# Patient Record
Sex: MALE | Race: WHITE | NOT HISPANIC OR LATINO
[De-identification: names, ages, dates, MRNs, and addresses within clinical notes are randomized per-mention and may not be internally consistent; named-entity substitution may affect disease eponyms.]

---
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
## 2020-02-16 ENCOUNTER — OFFICE VISIT (OUTPATIENT)
Dept: URGENT CARE | Facility: CLINIC | Age: 20
End: 2020-02-16
Payer: COMMERCIAL

## 2020-02-16 ENCOUNTER — APPOINTMENT (OUTPATIENT)
Dept: RADIOLOGY | Facility: IMAGING CENTER | Age: 20
End: 2020-02-16
Attending: NURSE PRACTITIONER
Payer: COMMERCIAL

## 2020-02-16 VITALS
BODY MASS INDEX: 21.12 KG/M2 | TEMPERATURE: 102.5 F | HEART RATE: 113 BPM | WEIGHT: 142.6 LBS | HEIGHT: 69 IN | SYSTOLIC BLOOD PRESSURE: 114 MMHG | RESPIRATION RATE: 16 BRPM | OXYGEN SATURATION: 94 % | DIASTOLIC BLOOD PRESSURE: 82 MMHG

## 2020-02-16 DIAGNOSIS — J02.9 PHARYNGITIS, UNSPECIFIED ETIOLOGY: ICD-10-CM

## 2020-02-16 DIAGNOSIS — R50.9 FEVER, UNSPECIFIED FEVER CAUSE: ICD-10-CM

## 2020-02-16 DIAGNOSIS — R11.0 NAUSEA: ICD-10-CM

## 2020-02-16 DIAGNOSIS — J03.90 EXUDATIVE TONSILLITIS: ICD-10-CM

## 2020-02-16 LAB
FLUAV+FLUBV AG SPEC QL IA: NEGATIVE
INT CON NEG: NEGATIVE
INT CON NEG: NEGATIVE
INT CON POS: POSITIVE
INT CON POS: POSITIVE
S PYO AG THROAT QL: NEGATIVE

## 2020-02-16 PROCEDURE — 87880 STREP A ASSAY W/OPTIC: CPT | Performed by: NURSE PRACTITIONER

## 2020-02-16 PROCEDURE — 99204 OFFICE O/P NEW MOD 45 MIN: CPT | Performed by: NURSE PRACTITIONER

## 2020-02-16 PROCEDURE — 87804 INFLUENZA ASSAY W/OPTIC: CPT | Performed by: NURSE PRACTITIONER

## 2020-02-16 PROCEDURE — 71046 X-RAY EXAM CHEST 2 VIEWS: CPT | Mod: TC | Performed by: NURSE PRACTITIONER

## 2020-02-16 RX ORDER — ONDANSETRON 4 MG/1
4 TABLET, ORALLY DISINTEGRATING ORAL ONCE
Status: COMPLETED | OUTPATIENT
Start: 2020-02-16 | End: 2020-02-16

## 2020-02-16 RX ORDER — DIPHENHYDRAMINE HYDROCHLORIDE AND LIDOCAINE HYDROCHLORIDE AND ALUMINUM HYDROXIDE AND MAGNESIUM HYDRO
5 KIT EVERY 6 HOURS PRN
Qty: 120 ML | Refills: 0 | Status: SHIPPED | OUTPATIENT
Start: 2020-02-16

## 2020-02-16 RX ORDER — OSELTAMIVIR PHOSPHATE 75 MG/1
75 CAPSULE ORAL 2 TIMES DAILY
Qty: 10 CAP | Refills: 0 | Status: CANCELLED | OUTPATIENT
Start: 2020-02-16

## 2020-02-16 RX ORDER — ONDANSETRON 4 MG/1
4 TABLET, ORALLY DISINTEGRATING ORAL EVERY 8 HOURS PRN
Qty: 10 TAB | Refills: 0 | Status: SHIPPED | OUTPATIENT
Start: 2020-02-16

## 2020-02-16 RX ORDER — AMOXICILLIN 500 MG/1
500 CAPSULE ORAL 2 TIMES DAILY
Qty: 20 CAP | Refills: 0 | Status: SHIPPED | OUTPATIENT
Start: 2020-02-16 | End: 2020-02-26

## 2020-02-16 RX ORDER — AMOXICILLIN 500 MG/1
500 CAPSULE ORAL 2 TIMES DAILY
Qty: 20 CAP | Refills: 0 | Status: CANCELLED | OUTPATIENT
Start: 2020-02-16 | End: 2020-02-26

## 2020-02-16 RX ORDER — ACETAMINOPHEN 500 MG
500 TABLET ORAL ONCE
Status: COMPLETED | OUTPATIENT
Start: 2020-02-16 | End: 2020-02-16

## 2020-02-16 RX ADMIN — ONDANSETRON 4 MG: 4 TABLET, ORALLY DISINTEGRATING ORAL at 16:26

## 2020-02-16 RX ADMIN — Medication 500 MG: at 16:25

## 2020-02-16 ASSESSMENT — ENCOUNTER SYMPTOMS
MYALGIAS: 0
CHILLS: 1
VOMITING: 1
FEVER: 1
DIZZINESS: 0
SHORTNESS OF BREATH: 0
COUGH: 1
SORE THROAT: 1
EYE PAIN: 0
HEADACHES: 1
RHINORRHEA: 1
NAUSEA: 1

## 2020-02-17 NOTE — PROGRESS NOTES
Subjective:     Barak Cheney  is a 19 y.o. male who presents for URI (body aches, felt feversih, productive cough, sore-throat x 3 days.    Was sick last week---got better but then got worse Friday again )       URI    This is a new problem. Episode onset: 2 weeks off and on cough. 1 day ago worsening symptoms with fever, severe body aches, sore throat. The problem has been unchanged. The maximum temperature recorded prior to his arrival was 102 - 102.9 F. The fever has been present for less than 1 day. Associated symptoms include congestion, coughing, headaches, nausea, rhinorrhea, a sore throat and vomiting. Pertinent negatives include no chest pain, dysuria, ear pain, plugged ear sensation or rash. He has tried sleep for the symptoms. The treatment provided no relief.   History reviewed. No pertinent past medical history.History reviewed. No pertinent surgical history.  Social History     Socioeconomic History   • Marital status: Single     Spouse name: Not on file   • Number of children: Not on file   • Years of education: Not on file   • Highest education level: Not on file   Occupational History   • Not on file   Social Needs   • Financial resource strain: Not on file   • Food insecurity     Worry: Not on file     Inability: Not on file   • Transportation needs     Medical: Not on file     Non-medical: Not on file   Tobacco Use   • Smoking status: Never Smoker   • Smokeless tobacco: Never Used   Substance and Sexual Activity   • Alcohol use: Yes     Comment: socially   • Drug use: Yes     Types: Marijuana   • Sexual activity: Not on file   Lifestyle   • Physical activity     Days per week: Not on file     Minutes per session: Not on file   • Stress: Not on file   Relationships   • Social connections     Talks on phone: Not on file     Gets together: Not on file     Attends Sabianism service: Not on file     Active member of club or organization: Not on file     Attends meetings of clubs or organizations: Not  "on file     Relationship status: Not on file   • Intimate partner violence     Fear of current or ex partner: Not on file     Emotionally abused: Not on file     Physically abused: Not on file     Forced sexual activity: Not on file   Other Topics Concern   • Not on file   Social History Narrative   • Not on file    History reviewed. No pertinent family history. Review of Systems   Constitutional: Positive for chills, fever and malaise/fatigue.   HENT: Positive for congestion, rhinorrhea and sore throat. Negative for ear pain.    Eyes: Negative for pain.   Respiratory: Positive for cough. Negative for shortness of breath.    Cardiovascular: Negative for chest pain.   Gastrointestinal: Positive for nausea and vomiting.   Genitourinary: Negative for dysuria and hematuria.   Musculoskeletal: Negative for myalgias.   Skin: Negative for rash.   Neurological: Positive for headaches. Negative for dizziness.   Not on File   Objective:   /82 (BP Location: Left arm, Patient Position: Sitting, BP Cuff Size: Adult)   Pulse (!) 113   Temp (!) 39.2 °C (102.5 °F) (Temporal)   Resp 16   Ht 1.753 m (5' 9\")   Wt 64.7 kg (142 lb 9.6 oz)   SpO2 94%   BMI 21.06 kg/m²   Physical Exam  Vitals signs and nursing note reviewed.   Constitutional:       General: He is not in acute distress.     Appearance: He is well-developed. He is ill-appearing.   HENT:      Head: Normocephalic and atraumatic.      Right Ear: Tympanic membrane and external ear normal.      Left Ear: Tympanic membrane and external ear normal.      Nose: Nose normal.      Right Sinus: No maxillary sinus tenderness or frontal sinus tenderness.      Left Sinus: No maxillary sinus tenderness or frontal sinus tenderness.      Mouth/Throat:      Mouth: Mucous membranes are moist.      Pharynx: Uvula midline. Posterior oropharyngeal erythema present.      Tonsils: Tonsillar exudate present. No tonsillar abscesses. 2+ on the right. 2+ on the left.   Eyes:      General:  "        Right eye: No discharge.         Left eye: No discharge.      Conjunctiva/sclera: Conjunctivae normal.      Pupils: Pupils are equal, round, and reactive to light.   Cardiovascular:      Rate and Rhythm: Normal rate and regular rhythm.      Heart sounds: No murmur.   Pulmonary:      Effort: Pulmonary effort is normal. No respiratory distress.      Breath sounds: Normal breath sounds.   Abdominal:      General: There is no distension.      Palpations: Abdomen is soft.      Tenderness: There is no abdominal tenderness.   Musculoskeletal: Normal range of motion.   Skin:     General: Skin is warm and dry.   Neurological:      General: No focal deficit present.      Mental Status: He is alert and oriented to person, place, and time. Mental status is at baseline.      Gait: Gait (gait at baseline) normal.   Psychiatric:         Judgment: Judgment normal.           Assessment/Plan:   Assessment    1. Pharyngitis, unspecified etiology  POCT Rapid Strep A    POCT Influenza A/B    amoxicillin (AMOXIL) 500 MG Cap    DPH-Lido-AlHydr-MgHydr-Simeth (MAGIC MOUTHWASH BLM) Suspension   2. Fever, unspecified fever cause  POCT Rapid Strep A    POCT Influenza A/B    acetaminophen (TYLENOL) tablet 500 mg    DX-CHEST-2 VIEWS   3. Nausea  ondansetron (ZOFRAN ODT) dispertab 4 mg    ondansetron (ZOFRAN ODT) 4 MG TABLET DISPERSIBLE   4. Exudative tonsillitis  amoxicillin (AMOXIL) 500 MG Cap    DPH-Lido-AlHydr-MgHydr-Simeth (MAGIC MOUTHWASH BLM) Suspension     Strep negative  Influenza negative     Xray results  No acute cardiopulmonary abnormality.     PT on clinical presentation has fever, adenopathy, exudate on oropharynx and it's possible beyond the strep A testing that this could be a different type of strep (C/G) or A. haemolyticum start patient on amoxicillin.  For suspected bacterial etiology.  Advised to continue supportive care with Tylenol and/or ibuprofen for fevers and discomfort. Increased fluids and  electrolytes.  Differential diagnosis, natural history, supportive care, and indications for immediate follow-up discussed.

## 2020-02-20 ENCOUNTER — HOSPITAL ENCOUNTER (EMERGENCY)
Facility: MEDICAL CENTER | Age: 20
End: 2020-02-21
Attending: EMERGENCY MEDICINE
Payer: COMMERCIAL

## 2020-02-20 DIAGNOSIS — J02.9 PHARYNGITIS, UNSPECIFIED ETIOLOGY: ICD-10-CM

## 2020-02-20 PROCEDURE — 36415 COLL VENOUS BLD VENIPUNCTURE: CPT

## 2020-02-20 PROCEDURE — 99285 EMERGENCY DEPT VISIT HI MDM: CPT

## 2020-02-20 PROCEDURE — 96374 THER/PROPH/DIAG INJ IV PUSH: CPT

## 2020-02-20 PROCEDURE — 700105 HCHG RX REV CODE 258: Performed by: EMERGENCY MEDICINE

## 2020-02-20 PROCEDURE — 700111 HCHG RX REV CODE 636 W/ 250 OVERRIDE (IP): Performed by: EMERGENCY MEDICINE

## 2020-02-20 RX ORDER — SODIUM CHLORIDE 9 MG/ML
1000 INJECTION, SOLUTION INTRAVENOUS ONCE
Status: COMPLETED | OUTPATIENT
Start: 2020-02-21 | End: 2020-02-21

## 2020-02-20 RX ORDER — KETOROLAC TROMETHAMINE 30 MG/ML
15 INJECTION, SOLUTION INTRAMUSCULAR; INTRAVENOUS ONCE
Status: COMPLETED | OUTPATIENT
Start: 2020-02-21 | End: 2020-02-20

## 2020-02-20 RX ADMIN — SODIUM CHLORIDE 1000 ML: 9 INJECTION, SOLUTION INTRAVENOUS at 23:58

## 2020-02-20 RX ADMIN — KETOROLAC TROMETHAMINE 15 MG: 30 INJECTION, SOLUTION INTRAMUSCULAR at 23:57

## 2020-02-21 VITALS
HEART RATE: 88 BPM | TEMPERATURE: 99.7 F | DIASTOLIC BLOOD PRESSURE: 59 MMHG | WEIGHT: 142.42 LBS | BODY MASS INDEX: 20.39 KG/M2 | HEIGHT: 70 IN | RESPIRATION RATE: 19 BRPM | SYSTOLIC BLOOD PRESSURE: 110 MMHG | OXYGEN SATURATION: 100 %

## 2020-02-21 LAB
ALBUMIN SERPL BCP-MCNC: 4.1 G/DL (ref 3.2–4.9)
ALBUMIN/GLOB SERPL: 1.1 G/DL
ALP SERPL-CCNC: 70 U/L (ref 30–99)
ALT SERPL-CCNC: 29 U/L (ref 2–50)
ANION GAP SERPL CALC-SCNC: 10 MMOL/L (ref 0–11.9)
AST SERPL-CCNC: 22 U/L (ref 12–45)
BASOPHILS # BLD AUTO: 0.4 % (ref 0–1.8)
BASOPHILS # BLD: 0.06 K/UL (ref 0–0.12)
BILIRUB SERPL-MCNC: 1.1 MG/DL (ref 0.1–1.5)
BUN SERPL-MCNC: 15 MG/DL (ref 8–22)
C TRACH DNA SPEC QL NAA+PROBE: NEGATIVE
CALCIUM SERPL-MCNC: 9.2 MG/DL (ref 8.5–10.5)
CHLORIDE SERPL-SCNC: 102 MMOL/L (ref 96–112)
CO2 SERPL-SCNC: 25 MMOL/L (ref 20–33)
CREAT SERPL-MCNC: 1.25 MG/DL (ref 0.5–1.4)
EOSINOPHIL # BLD AUTO: 0.12 K/UL (ref 0–0.51)
EOSINOPHIL NFR BLD: 0.9 % (ref 0–6.9)
ERYTHROCYTE [DISTWIDTH] IN BLOOD BY AUTOMATED COUNT: 39.7 FL (ref 35.9–50)
GLOBULIN SER CALC-MCNC: 3.9 G/DL (ref 1.9–3.5)
GLUCOSE SERPL-MCNC: 97 MG/DL (ref 65–99)
HCT VFR BLD AUTO: 43.6 % (ref 42–52)
HETEROPH AB SER QL: NEGATIVE
HGB BLD-MCNC: 14.9 G/DL (ref 14–18)
IMM GRANULOCYTES # BLD AUTO: 0.07 K/UL (ref 0–0.11)
IMM GRANULOCYTES NFR BLD AUTO: 0.5 % (ref 0–0.9)
LACTATE BLD-SCNC: 1.3 MMOL/L (ref 0.5–2)
LYMPHOCYTES # BLD AUTO: 1.69 K/UL (ref 1–4.8)
LYMPHOCYTES NFR BLD: 12.5 % (ref 22–41)
MCH RBC QN AUTO: 30.5 PG (ref 27–33)
MCHC RBC AUTO-ENTMCNC: 34.2 G/DL (ref 33.7–35.3)
MCV RBC AUTO: 89.3 FL (ref 81.4–97.8)
MONOCYTES # BLD AUTO: 1.06 K/UL (ref 0–0.85)
MONOCYTES NFR BLD AUTO: 7.8 % (ref 0–13.4)
N GONORRHOEA DNA SPEC QL NAA+PROBE: NEGATIVE
NEUTROPHILS # BLD AUTO: 10.54 K/UL (ref 1.82–7.42)
NEUTROPHILS NFR BLD: 77.9 % (ref 44–72)
NRBC # BLD AUTO: 0 K/UL
NRBC BLD-RTO: 0 /100 WBC
PLATELET # BLD AUTO: 239 K/UL (ref 164–446)
PMV BLD AUTO: 8.4 FL (ref 9–12.9)
POTASSIUM SERPL-SCNC: 3.8 MMOL/L (ref 3.6–5.5)
PROT SERPL-MCNC: 8 G/DL (ref 6–8.2)
RBC # BLD AUTO: 4.88 M/UL (ref 4.7–6.1)
S PYO DNA SPEC NAA+PROBE: NOT DETECTED
SODIUM SERPL-SCNC: 137 MMOL/L (ref 135–145)
SPECIMEN SOURCE: NORMAL
WBC # BLD AUTO: 13.5 K/UL (ref 4.8–10.8)

## 2020-02-21 PROCEDURE — 87591 N.GONORRHOEAE DNA AMP PROB: CPT

## 2020-02-21 PROCEDURE — 80053 COMPREHEN METABOLIC PANEL: CPT

## 2020-02-21 PROCEDURE — 700111 HCHG RX REV CODE 636 W/ 250 OVERRIDE (IP): Performed by: EMERGENCY MEDICINE

## 2020-02-21 PROCEDURE — 87491 CHLMYD TRACH DNA AMP PROBE: CPT

## 2020-02-21 PROCEDURE — 85025 COMPLETE CBC W/AUTO DIFF WBC: CPT

## 2020-02-21 PROCEDURE — 86308 HETEROPHILE ANTIBODY SCREEN: CPT

## 2020-02-21 PROCEDURE — 87651 STREP A DNA AMP PROBE: CPT

## 2020-02-21 PROCEDURE — 87040 BLOOD CULTURE FOR BACTERIA: CPT

## 2020-02-21 PROCEDURE — 83605 ASSAY OF LACTIC ACID: CPT

## 2020-02-21 RX ORDER — CLINDAMYCIN HYDROCHLORIDE 300 MG/1
300 CAPSULE ORAL 3 TIMES DAILY
Qty: 30 CAP | Refills: 0 | Status: SHIPPED | OUTPATIENT
Start: 2020-02-21 | End: 2020-03-02

## 2020-02-21 RX ORDER — ONDANSETRON 4 MG/1
4 TABLET, ORALLY DISINTEGRATING ORAL EVERY 6 HOURS PRN
Qty: 10 TAB | Refills: 0 | Status: SHIPPED | OUTPATIENT
Start: 2020-02-21

## 2020-02-21 RX ORDER — DEXAMETHASONE SODIUM PHOSPHATE 10 MG/ML
12 INJECTION, SOLUTION INTRAMUSCULAR; INTRAVENOUS ONCE
Status: COMPLETED | OUTPATIENT
Start: 2020-02-21 | End: 2020-02-21

## 2020-02-21 RX ADMIN — DEXAMETHASONE SODIUM PHOSPHATE 12 MG: 10 INJECTION INTRAMUSCULAR; INTRAVENOUS at 02:00

## 2020-02-21 NOTE — DISCHARGE INSTRUCTIONS
You were seen in the Emergency Department for pharyngitis.    Labs were completed without significant acute abnormalities other than mild elevation of white blood cell count.  Drug testing was negative.  Further cultures are pending.  Mono testing was negative although this may need to be repeated in a week or 2 for continued symptoms.    Please use 1,000mg of tylenol or 600mg of ibuprofen every 6 hours as needed for pain.  Stop taking amoxicillin and start new antibiotic.    Please follow up with your primary care physician for recheck on Monday.    Return to the Emergency Department with persistent fevers greater than 100.4, trouble breathing, persistent vomiting, or other concerns.

## 2020-02-21 NOTE — ED NOTES
Patient discharged with instructions for pharyngitis  with prescriptions x2 signs and symptoms explained to patient for reasons to return to emergency department, Patient is understanding and has no further questions.

## 2020-02-21 NOTE — ED PROVIDER NOTES
ED Provider Note    Scribed for Sylvie Drummond M.D. by Dafne Christensen. 2/20/2020  11:35 PM    Means of arrival: Walk-in  History obtained from: Patient  History limited by: None    CHIEF COMPLAINT  Chief Complaint   Patient presents with   • Fever   • Sore Throat     HPI  Barak Cheney is a 19 y.o. otherwise healthy male who presents to the Emergency Department for fever and sore throat that began about 5 days ago. The patient was seen at urgent care with a negative influenza and strep test.  He was prescribed amoxicillin for concern for bacterial pharyngitis. The patient states his symptoms have not been improving.  He states he has had consistent fevers, sore throat, myalgias.. The patient has used anti-inflammatories with mild alleviation. He denies cough or congestion. The patient states he has mucous draining into his stomach. He reports abdominal pain which he attributes to the mucous drainage. Additionally, he reports a mild headache. The patient denies recent travel. He denies any medical history of medication use.  Patient denies any concern for sexually transmitted infection or HIV.    REVIEW OF SYSTEMS  Pertinent positive include fever, vomiting, muscle fatigue, sore throat, mucous drainage, abdominal pain, and headache. Pertinent negative include no cough or congestion. All other systems reviewed and are negative.    PAST MEDICAL HISTORY   Patient has no pertinent past medical history.    SOCIAL HISTORY  Social History     Tobacco Use   • Smoking status: Never Smoker   • Smokeless tobacco: Never Used   Substance and Sexual Activity   • Alcohol use: Yes     Comment: socially   • Drug use: Yes     Types: Marijuana   • Sexual activity: Not pertinent     SURGICAL HISTORY  patient denies any surgical history    CURRENT MEDICATIONS  Current Outpatient Medications:   •  amoxicillin (AMOXIL) 500 MG Cap, Take 1 Cap by mouth 2 times a day for 10 days., Disp: 20 Cap, Rfl: 0  •  DPH-Lido-AlHydr-MgHydr-Simeth  "(MAGIC MOUTHWASH BLM) Suspension, Take 5 mL by mouth every 6 hours as needed., Disp: 120 mL, Rfl: 0  •  ondansetron (ZOFRAN ODT) 4 MG TABLET DISPERSIBLE, Take 1 Tab by mouth every 8 hours as needed., Disp: 10 Tab, Rfl: 0    ALLERGIES  No Known Allergies    PHYSICAL EXAM   VITAL SIGNS: /84   Pulse (!) 107   Temp 37.7 °C (99.9 °F) (Oral)   Resp 19   Ht 1.778 m (5' 10\")   Wt 64.6 kg (142 lb 6.7 oz)   SpO2 95%   BMI 20.43 kg/m²    Constitutional: Young male, Non-toxic appearing, Alert in no apparent distress.  HENT: Normocephalic, Atraumatic. Bilateral external ears normal. Nose normal.  Moist mucous membranes.  Posterior oropharynx with erythema and exudates. No significant tonsillar swelling.  Eyes: Pupils are equal and reactive. Conjunctiva normal.   Neck: Supple, full range of motion, Cervical lymphadenopathy, No meningismus.  Heart: Regular rate and rhythm.  No murmurs.    Lungs: No respiratory distress, normal work of breathing. Lungs clear to auscultation bilaterally.  Abdomen Soft, no distention.  No tenderness to palpation.  Musculoskeletal: Atraumatic. No obvious deformities noted.  No lower extremity edema.  Skin: Warm, Dry.  No erythema, No rash.   Neurologic: Alert and oriented x3. Moving all extremities spontaneously without focal deficits.  Psychiatric: Affect normal, Mood normal, Appears appropriate and not intoxicated.    DIAGNOSTIC STUDIES    LABS  Personally reviewed by me  Labs Reviewed   CBC WITH DIFFERENTIAL - Abnormal; Notable for the following components:       Result Value    WBC 13.5 (*)     MPV 8.4 (*)     Neutrophils-Polys 77.90 (*)     Lymphocytes 12.50 (*)     Neutrophils (Absolute) 10.54 (*)     Monos (Absolute) 1.06 (*)     All other components within normal limits   COMP METABOLIC PANEL - Abnormal; Notable for the following components:    Globulin 3.9 (*)     All other components within normal limits   LACTIC ACID   BLOOD CULTURE    Narrative:     Per Hospital Policy: Only " "change Specimen Src: to \"Line\" if  specified by physician order.   BLOOD CULTURE    Narrative:     Per Hospital Policy: Only change Specimen Src: to \"Line\" if  specified by physician order.   GROUP A STREP BY PCR   HETEROPHILE AB SCREEN   ESTIMATED GFR   CHLAMYDIA/GC PCR URINE OR SWAB    Narrative:     NOT CERVICAL - FROM THROAT  NOT GENITAL FROM THROAT     ED COURSE  Vitals:    02/20/20 2253 02/20/20 2302 02/21/20 0150   BP: 126/84  110/59   Pulse: (!) 107  88   Resp: 19     Temp: 37.7 °C (99.9 °F)  37.6 °C (99.7 °F)   TempSrc: Oral  Temporal   SpO2: 95%  100%   Weight:  64.6 kg (142 lb 6.7 oz)    Height:  1.778 m (5' 10\")      Medications administered:  Medications   ketorolac (TORADOL) injection 15 mg (15 mg Intravenous Given 2/20/20 0967)   NS infusion 1,000 mL (0 mL Intravenous Stopped 2/21/20 0142)   dexamethasone pf (DECADRON) injection 12 mg (12 mg Oral Given 2/21/20 0200)     Patient was given IV fluids for tachycardia and poor p.o. intake.  IV hydration was used because oral hydration was not adequate alone.  Following fluid administration patient's symptoms and hydration status were improved.     11:35 PM Patient seen and examined at bedside. The patient presents with fever and sore throat. Ordered for Estimated GFR, Group A Strep by PCR, Mononucleosis Test Qual, CBC with Differential, CMP, Lactic Acid, and Blood Culture to evaluate. Patient will be treated with Toradol 15 mg and NS Infusion 1000 mL for his symptoms.     MEDICAL DECISION MAKING  Otherwise healthy young male presents with 5-day history of fever and sore throat.  He has been on amoxicillin for presumed bacterial infection however is not improving.  He is afebrile, mildly tachycardic on arrival with otherwise reassuring vitals and nontoxic-appearing.  History and exam are not concerning for meningitis, peritonsillar abscess, retropharyngeal abscess.  He does have evidence of significant tonsillar erythema and exudates however.  Labs show " evidence of mild leukocytosis however otherwise reassuring without signs of sepsis.  Strep and mono testing are negative, however it is possible the Monospot is not positive this early in his symptoms.  I had a discussion with the patient regarding the possibility of sexually transmitted infection such as HIV, gonorrhea, or chlamydia.  He is denies any concern for these infections as he is in a monogamous relationship.  Swabs are currently pending however I have not treated him empirically.  It is possible that his symptoms are due to of viral process however I will transition him from amoxicillin to clindamycin for broader bacterial coverage.    1:17 AM - Upon reassessment, patient is resting comfortably with normal vital signs.  No new complaints at this time.  discussed results with patient and/or family as well as importance of primary care follow up next week for recheck.  Patient understands plan of care and strict return precautions for new or changing symptoms.        The patient will return for new or worsening symptoms and is stable at the time of discharge.    The patient is referred to a primary physician for blood pressure management, diabetic screening, and for all other preventative health concerns.    DISPOSITION:  Patient will be discharged home in stable condition.    FOLLOW UP:  Alex ROCHA M.D.  [address] [facility] [id number] [phone]    Schedule an appointment as soon as possible for a visit       [facility], Emergency Dept  [address] [facility] [address] [phone]    If symptoms worsen    IMPRESSION  (J02.9) Pharyngitis, unspecified etiology    Results, diagnoses, and treatment options were discussed with the patient and/or family. Patient verbalized understanding of plan of care.    Discharge Medication List as of 2/21/2020  1:53 AM      START taking these medications    Details   clindamycin (CLEOCIN) 300 MG Cap Take 1 Cap by mouth 3  times a day for 10 days., Disp-30 Cap, R-0, Print Rx Paper      !! ondansetron (ZOFRAN ODT) 4 MG TABLET DISPERSIBLE Take 1 Tab by mouth every 6 hours as needed for Nausea., Disp-10 Tab, R-0, Print Rx Paper       !! - Potential duplicate medications found. Please discuss with provider.               Dafne GOODRICH (Scribe), am scribing for, and in the presence of, Sylvie Drummond M.D..    Electronically signed by: Dafne Christensen (Scribe), 2/20/2020    Sylvie GOODRICH M.D. personally performed the services described in this documentation, as scribed by Dafne Christensen in my presence, and it is both accurate and complete. C    The note accurately reflects work and decisions made by me.  Sylvie Drummond M.D.  2/21/2020  3:51 AM

## 2020-02-21 NOTE — ED TRIAGE NOTES
"Chief Complaint   Patient presents with   • Fever   • Sore Throat     18 yo male to triage for above complaint. Pt was seen at urgent care on Saturday and prescribed Amoxicillin, reports condition has not been getting better, still reports sore throat and fevers.    Educated on triage process, encourage to inform staff of any changes.     /84   Pulse (!) 107   Temp 37.7 °C (99.9 °F) (Oral)   Resp 19   Ht 1.778 m (5' 10\")   Wt 64.6 kg (142 lb 6.7 oz)   SpO2 95%   BMI 20.43 kg/m²   "

## 2020-02-26 LAB
BACTERIA BLD CULT: NORMAL
BACTERIA BLD CULT: NORMAL
SIGNIFICANT IND 70042: NORMAL
SIGNIFICANT IND 70042: NORMAL
SITE SITE: NORMAL
SITE SITE: NORMAL
SOURCE SOURCE: NORMAL
SOURCE SOURCE: NORMAL

## 2020-05-26 ENCOUNTER — NON-PROVIDER VISIT (OUTPATIENT)
Dept: URGENT CARE | Facility: PHYSICIAN GROUP | Age: 20
End: 2020-05-26

## 2020-05-26 DIAGNOSIS — Z02.1 PRE-EMPLOYMENT DRUG SCREENING: ICD-10-CM

## 2020-05-26 LAB
AMP AMPHETAMINE: NORMAL
COC COCAINE: NORMAL
INT CON NEG: NORMAL
INT CON POS: NORMAL
MET METHAMPHETAMINES: NORMAL
OPI OPIATES: NORMAL
PCP PHENCYCLIDINE: NORMAL
POC DRUG COMMENT 753798-OCCUPATIONAL HEALTH: NEGATIVE
THC: NORMAL

## 2020-05-26 PROCEDURE — 80305 DRUG TEST PRSMV DIR OPT OBS: CPT | Performed by: NURSE PRACTITIONER

## 2020-05-26 NOTE — NON-PROVIDER
Barak Cheney is a 20 y.o. male here for a non-provider visit for pre-emp drug screen    If abnormal was an in office provider notified today (if so, indicate provider)? No  Routed to PCP? No

## 2022-04-08 ENCOUNTER — NON-PROVIDER VISIT (OUTPATIENT)
Dept: OCCUPATIONAL MEDICINE | Facility: CLINIC | Age: 22
End: 2022-04-08

## 2022-04-08 DIAGNOSIS — Z02.1 PRE-EMPLOYMENT HEALTH SCREENING EXAMINATION: ICD-10-CM

## 2022-04-08 PROCEDURE — 8907 PR URINE COLLECT ONLY: Performed by: PREVENTIVE MEDICINE
